# Patient Record
Sex: MALE | Race: OTHER | NOT HISPANIC OR LATINO | Employment: FULL TIME | URBAN - METROPOLITAN AREA
[De-identification: names, ages, dates, MRNs, and addresses within clinical notes are randomized per-mention and may not be internally consistent; named-entity substitution may affect disease eponyms.]

---

## 2021-10-18 ENCOUNTER — OCCUPATIONAL HEALTH (OUTPATIENT)
Dept: URGENT CARE | Facility: CLINIC | Age: 41
End: 2021-10-18

## 2021-10-18 DIAGNOSIS — Z02.83 ENCOUNTER FOR DRUG SCREENING: Primary | ICD-10-CM

## 2021-10-18 PROCEDURE — 80305 DRUG TEST PRSMV DIR OPT OBS: CPT | Mod: S$GLB,,, | Performed by: NURSE PRACTITIONER

## 2021-10-18 PROCEDURE — 80305 OOH COLLECTION ONLY DRUG SCREEN: ICD-10-PCS | Mod: S$GLB,,, | Performed by: NURSE PRACTITIONER

## 2022-05-05 ENCOUNTER — HOSPITAL ENCOUNTER (EMERGENCY)
Facility: HOSPITAL | Age: 42
Discharge: LEFT BEFORE DISCHARGE | End: 2022-05-05
Attending: EMERGENCY MEDICINE
Payer: MEDICAID

## 2022-05-05 VITALS
HEART RATE: 70 BPM | HEIGHT: 74 IN | TEMPERATURE: 98 F | WEIGHT: 225 LBS | SYSTOLIC BLOOD PRESSURE: 120 MMHG | BODY MASS INDEX: 28.88 KG/M2 | DIASTOLIC BLOOD PRESSURE: 70 MMHG | OXYGEN SATURATION: 100 % | RESPIRATION RATE: 17 BRPM
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
## 2022-05-05 PROCEDURE — 90471 IMMUNIZATION ADMIN: CPT | Performed by: EMERGENCY MEDICINE

## 2022-05-05 PROCEDURE — 63600175 PHARM REV CODE 636 W HCPCS: Performed by: EMERGENCY MEDICINE

## 2022-05-05 PROCEDURE — 99284 EMERGENCY DEPT VISIT MOD MDM: CPT

## 2022-05-05 PROCEDURE — 90715 TDAP VACCINE 7 YRS/> IM: CPT | Performed by: EMERGENCY MEDICINE

## 2022-05-05 PROCEDURE — 25000003 PHARM REV CODE 250: Performed by: EMERGENCY MEDICINE

## 2022-05-05 RX ORDER — AMOXICILLIN AND CLAVULANATE POTASSIUM 875; 125 MG/1; MG/1
1 TABLET, FILM COATED ORAL 2 TIMES DAILY
Qty: 20 TABLET | Refills: 0 | Status: SHIPPED | OUTPATIENT
Start: 2022-05-05

## 2022-05-05 RX ORDER — MUPIROCIN 20 MG/G
OINTMENT TOPICAL
Status: COMPLETED | OUTPATIENT
Start: 2022-05-05 | End: 2022-05-05

## 2022-05-05 RX ADMIN — MUPIROCIN: 20 OINTMENT TOPICAL at 01:05

## 2022-05-05 RX ADMIN — CLOSTRIDIUM TETANI TOXOID ANTIGEN (FORMALDEHYDE INACTIVATED), CORYNEBACTERIUM DIPHTHERIAE TOXOID ANTIGEN (FORMALDEHYDE INACTIVATED), BORDETELLA PERTUSSIS TOXOID ANTIGEN (GLUTARALDEHYDE INACTIVATED), BORDETELLA PERTUSSIS FILAMENTOUS HEMAGGLUTININ ANTIGEN (FORMALDEHYDE INACTIVATED), BORDETELLA PERTUSSIS PERTACTIN ANTIGEN, AND BORDETELLA PERTUSSIS FIMBRIAE 2/3 ANTIGEN 0.5 ML: 5; 2; 2.5; 5; 3; 5 INJECTION, SUSPENSION INTRAMUSCULAR at 01:05

## 2022-05-05 NOTE — ED PROVIDER NOTES
Encounter Date: 5/5/2022       History     Chief Complaint   Patient presents with    Animal Bite     Pt reports dog bite to left thumb and right leg, pt states dog was not vaccinated.      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  He also reports that he fell on his buttocks when he was trying to get away from the dog and he has a recent back surgery and has some back tenderness.  The patient is unsure of his last tetanus shot he is unsure of the dog's rabies status.         Review of patient's allergies indicates:   Allergen Reactions    Iodinated contrast media Rash     No past medical history on file.  No past surgical history on file.  No family history on file.     Review of Systems   Constitutional: Negative for fever.   HENT: Negative.    Respiratory: Negative.    Cardiovascular: Negative.    Gastrointestinal: Negative.    Genitourinary: Negative.    Musculoskeletal: Positive for back pain.        Puncture wound left thigh, abrasion left thumb   Skin: Negative.    Allergic/Immunologic: Negative.    Neurological: Negative.    Hematological: Negative.    Psychiatric/Behavioral: Negative.    All other systems reviewed and are negative.      Physical Exam     Initial Vitals [05/05/22 1244]   BP Pulse Resp Temp SpO2   126/84 73 16 97.8 °F (36.6 °C) 99 %      MAP       --         Physical Exam    Nursing note and vitals reviewed.  Constitutional: He appears well-developed and well-nourished.   HENT:   Head: Normocephalic and atraumatic.   Eyes: Conjunctivae and EOM are normal. Pupils are equal, round, and reactive to light.   Neck: Neck supple.   Normal range of motion.  Cardiovascular: Normal rate, regular rhythm, normal heart sounds and intact distal pulses.   Pulmonary/Chest: Breath sounds normal.   Abdominal: Abdomen is  soft. Bowel sounds are normal.   Musculoskeletal:      Cervical back: Normal range of motion and neck supple.      Comments: Tenderness left thigh  and left thumb      Neurological: He is alert and oriented to person, place, and time. He has normal strength. GCS score is 15. GCS eye subscore is 4. GCS verbal subscore is 5. GCS motor subscore is 6.   Skin:   Puncture wound to the left thigh, hemostasis is noted, abrasion to the left thumb no decreased range of motion with passive resistance no circumferential swelling         ED Course   Procedures  Labs Reviewed - No data to display       Imaging Results          X-Ray Femur Ap/Lat Left (Final result)  Result time 05/05/22 14:33:58    Final result by Adam Tomas MD (05/05/22 14:33:58)                 Narrative:    LEFT FEMUR 2 VIEWS    CLINICAL DATA: Dog bite    FINDINGS: AP and lateral views are negative for fracture or osseous destructive lesion. There is no periosteal reaction. No soft tissue abnormality is identified.    Mild arthritic change is noted at the left hip.    IMPRESSION:    1. No acute process.  2. Mild arthritic change at the left hip.    Electronically signed by:  Adam Tomas MD  5/5/2022 2:33 PM [employer] Workstation: [id number]                             X-Ray Lumbar Spine 5 View (Final result)  Result time 05/05/22 14:31:43    Final result by Adam Tomas MD (05/05/22 14:31:43)                 Narrative:    LUMBAR SPINE 5 VIEWS    Clinical data:   Trauma, back pain    FINDINGS: 5 views are negative for fracture or subluxation.  No osseous destructive lesion is identified.  Disc height is well-preserved. Bony mineralization is normal.    IMPRESSION:    1. Normal lumbar spine.    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  He also reports that he fell on his buttocks when he was trying to get away from the dog and he has a recent back surgery and has some back tenderness.  The patient is unsure of his last tetanus shot he is unsure of the dog's rabies status.         Differential Diagnosis:   Puncture wound , retained foreign body.   ED Management:  41-year-old male presents to the emergency department complaint of dog bite to his left thigh, an abrasion to the left thumb x-ray imaging is negative for any acute foreign bodies or fractures.  Patient also reports that he fell on his back attempting to get away from the dog had a recent back surgery and was requesting back x-rays which are unremarkable.  Patient refused to give information for us to contact animal control, he also was offered rabies vaccine and immunoglobulin however declines.  Patient will be discharged home with Augmentin he was given detailed return precautions.  Because patient is refusing for me to call animal control for dog to be quarantine and to assess for rabies and to receive rabies vaccine and immunoglobulin patient will sign out against medical advice.                      Clinical Impression:   Final diagnoses:  [W54.0XXA] Dog bite, initial encounter (Primary)          ED Disposition Condition    CHAIM Maxwell, RHONDA  05/05/22 6068

## 2024-12-12 ENCOUNTER — TELEPHONE (OUTPATIENT)
Dept: UROLOGY | Facility: CLINIC | Age: 44
End: 2024-12-12
Payer: MEDICAID

## 2024-12-12 NOTE — TELEPHONE ENCOUNTER
----- Message from Haley sent at 12/12/2024  2:08 PM CST -----  Regarding: Sooner appt  Contact: [phone]  Who call ? Segundo Marie     What is the request Details : Pt calling to speak with someone in provider office regards appt on 3/25. States he would like to be scheduled sooner.  No appt available. Please call pt back.       Can clinic  use patient portal  : No     What number to call back : [phone]

## 2024-12-12 NOTE — TELEPHONE ENCOUNTER
Spoke with patient.  Answered all of patient questions.  Patient verbalized understanding.    ADAM Jauregui

## 2025-01-28 NOTE — PROGRESS NOTES
"Subjective:      Segundo Marie is a 44 y.o. male who presents  for evaluation of fertility.      Presents today to discuss recent fertility workup conducted by Dr. Kelly.  He brings report of to semen analysis.  1st completed 10/21/2024 showing oligospermia, asthenospermia, hyper spermia. Second SA performed 11/27 showed oligospermia, asthenospermia, low sperm viability and teratospermia.   Testicular US revealed b/l varicoceles measuring up to 6 mm.   Patient and his partner have never had pregnancy  His partner has complete fertility workup which was normal   T level: 293    The following portions of the patient's history were reviewed and updated as appropriate: allergies, current medications, past family history, past medical history, past social history, past surgical history and problem list.    Review of Systems  Constitutional: no fever or chills  ENT: no nasal congestion or sore throat  Respiratory: no cough or shortness of breath  Cardiovascular: no chest pain or palpitations  Gastrointestinal: no nausea or vomiting, tolerating diet  Genitourinary: as per HPI  Hematologic/Lymphatic: no easy bruising or lymphadenopathy  Musculoskeletal: no arthralgias or myalgias  Neurological: no seizures or tremors  Behavioral/Psych: no auditory or visual hallucinations     Objective:   Vitals: /82 (BP Location: Left arm, Patient Position: Sitting)   Pulse 85   Ht 6' 2" (1.88 m)   Wt 120.1 kg (264 lb 14.1 oz)   BMI 34.01 kg/m²     Physical Exam   General: alert and oriented, no acute distress  Head: normocephalic, atraumatic  Neck: supple, no lymphadenopathy, normal ROM, no masses  Genitourinary: declined   Skin: normal coloration and turgor, no rashes, no suspicious skin lesions noted  Psych: normal judgment and insight, normal mood/affect, and non-anxious    Physical Exam    Lab Review   Urinalysis demonstrates negative    EXAM: US SCROTUM AND TESTICLES     CLINICAL HISTORY:    [N50.89]-Other specified " disorders of the male genital organs.     TECHNIQUE: Routine testicular ultrasound using color and spectral Doppler.     FINDINGS:     Right testicle: 3.6 x 1.8 x 2.5 cm.  No testicular masses.  Normal arterial and vascular flow.     Left testicle: 3.5 x 1.8 x 2.2 cm cm.  No testicular masses.  Normal arterial and vascular flow.     Epididymis:     Normal in size and echogenicity.  Normal vascular flow.  4 mm epididymal head cyst on the left.     Hydroceles:  None     Varicoceles: Varicoceles bilaterally measuring ranging in size from 4 to 6 mm with Valsalva.     Scrotal skin: Unremarkable       IMPRESSION: No acute findings.           Finalized on: 11/13/2024 5:17 PM By:  Lokesh Ghosh   [facility]# [id number]      2024-11-13 17:19:49.502    [facility]       PVR: 0cc   Assessment and Plan:   1. Encounter for fertility testing  2. Low testosterone  --reviewed semen analysis and ultrasound report with patient.  Discussed options moving forward which would include bilateral hydrocelectomy versus IUI/IVF.  Another option would be to trial Clomid every other day to see if it boost testosterone and sperm production    --He will take Clomid every other day and we will reassess testosterone level and semen analysis after 2 months  --start vitamin-E and vitamin-C supplement  --avoid wet heat      - clomiPHENE (CLOMID) 50 mg tablet; Take 1 tablet (50 mg total) by mouth once daily. for 10 days  Dispense: 15 tablet; Refill: 5    This note is dictated on M*Modal word recognition program.  There are word recognition mistakes that are occasionally missed on review.

## 2025-01-29 ENCOUNTER — OFFICE VISIT (OUTPATIENT)
Dept: UROLOGY | Facility: CLINIC | Age: 45
End: 2025-01-29
Payer: MEDICAID

## 2025-01-29 VITALS
HEIGHT: 74 IN | HEART RATE: 85 BPM | DIASTOLIC BLOOD PRESSURE: 82 MMHG | WEIGHT: 264.88 LBS | SYSTOLIC BLOOD PRESSURE: 130 MMHG | BODY MASS INDEX: 33.99 KG/M2

## 2025-01-29 DIAGNOSIS — Z31.41 ENCOUNTER FOR FERTILITY TESTING: Primary | ICD-10-CM

## 2025-01-29 DIAGNOSIS — R79.89 LOW TESTOSTERONE: ICD-10-CM

## 2025-01-29 LAB
BILIRUB SERPL-MCNC: NORMAL MG/DL
BLOOD URINE, POC: NEGATIVE
CLARITY, POC UA: CLEAR
COLOR, POC UA: NORMAL
GLUCOSE UR QL STRIP: NEGATIVE
KETONES UR QL STRIP: NORMAL
LEUKOCYTE ESTERASE URINE, POC: NEGATIVE
NITRITE, POC UA: NEGATIVE
PH, POC UA: 6
POC RESIDUAL URINE VOLUME: 4 ML (ref 0–100)
PROTEIN, POC: NEGATIVE
SPECIFIC GRAVITY, POC UA: 1.02
UROBILINOGEN, POC UA: NORMAL

## 2025-01-29 PROCEDURE — 99213 OFFICE O/P EST LOW 20 MIN: CPT | Mod: PBBFAC,PN | Performed by: NURSE PRACTITIONER

## 2025-01-29 PROCEDURE — 99999 PR PBB SHADOW E&M-EST. PATIENT-LVL III: CPT | Mod: PBBFAC,,, | Performed by: NURSE PRACTITIONER

## 2025-01-29 PROCEDURE — 3008F BODY MASS INDEX DOCD: CPT | Mod: CPTII,,, | Performed by: NURSE PRACTITIONER

## 2025-01-29 PROCEDURE — 99999PBSHW POCT URINE DIPSTICK WITHOUT MICROSCOPE: Mod: PBBFAC,,,

## 2025-01-29 PROCEDURE — 99999PBSHW POCT BLADDER SCAN: Mod: PBBFAC,,,

## 2025-01-29 PROCEDURE — 51798 US URINE CAPACITY MEASURE: CPT | Mod: PBBFAC,PN | Performed by: NURSE PRACTITIONER

## 2025-01-29 PROCEDURE — 1160F RVW MEDS BY RX/DR IN RCRD: CPT | Mod: CPTII,,, | Performed by: NURSE PRACTITIONER

## 2025-01-29 PROCEDURE — 3075F SYST BP GE 130 - 139MM HG: CPT | Mod: CPTII,,, | Performed by: NURSE PRACTITIONER

## 2025-01-29 PROCEDURE — 1159F MED LIST DOCD IN RCRD: CPT | Mod: CPTII,,, | Performed by: NURSE PRACTITIONER

## 2025-01-29 PROCEDURE — 81002 URINALYSIS NONAUTO W/O SCOPE: CPT | Mod: PBBFAC,PN | Performed by: NURSE PRACTITIONER

## 2025-01-29 PROCEDURE — 99204 OFFICE O/P NEW MOD 45 MIN: CPT | Mod: S$PBB,,, | Performed by: NURSE PRACTITIONER

## 2025-01-29 PROCEDURE — 3079F DIAST BP 80-89 MM HG: CPT | Mod: CPTII,,, | Performed by: NURSE PRACTITIONER

## 2025-01-29 RX ORDER — CLOMIPHENE CITRATE 50 MG/1
50 TABLET ORAL DAILY
Qty: 15 TABLET | Refills: 5 | Status: SHIPPED | OUTPATIENT
Start: 2025-01-29 | End: 2025-02-08

## 2025-01-29 RX ORDER — GABAPENTIN 100 MG/1
CAPSULE ORAL
COMMUNITY

## 2025-01-29 RX ORDER — OMEPRAZOLE 40 MG/1
40 CAPSULE, DELAYED RELEASE ORAL
COMMUNITY